# Patient Record
Sex: MALE | Race: WHITE | NOT HISPANIC OR LATINO | Employment: FULL TIME | URBAN - METROPOLITAN AREA
[De-identification: names, ages, dates, MRNs, and addresses within clinical notes are randomized per-mention and may not be internally consistent; named-entity substitution may affect disease eponyms.]

---

## 2020-07-30 ENCOUNTER — OFFICE VISIT (OUTPATIENT)
Dept: INTERNAL MEDICINE CLINIC | Facility: CLINIC | Age: 35
End: 2020-07-30

## 2020-07-30 ENCOUNTER — APPOINTMENT (OUTPATIENT)
Dept: LAB | Facility: HOSPITAL | Age: 35
End: 2020-07-30
Payer: COMMERCIAL

## 2020-07-30 VITALS
TEMPERATURE: 96.6 F | HEART RATE: 60 BPM | DIASTOLIC BLOOD PRESSURE: 72 MMHG | SYSTOLIC BLOOD PRESSURE: 104 MMHG | WEIGHT: 157.41 LBS | BODY MASS INDEX: 24.71 KG/M2 | HEIGHT: 67 IN

## 2020-07-30 DIAGNOSIS — Z00.00 ENCOUNTER FOR MEDICAL EXAMINATION TO ESTABLISH CARE: Primary | ICD-10-CM

## 2020-07-30 DIAGNOSIS — Z00.00 ENCOUNTER FOR MEDICAL EXAMINATION TO ESTABLISH CARE: ICD-10-CM

## 2020-07-30 LAB
ALBUMIN SERPL BCP-MCNC: 4 G/DL (ref 3.5–5)
ALP SERPL-CCNC: 67 U/L (ref 46–116)
ALT SERPL W P-5'-P-CCNC: 42 U/L (ref 12–78)
ANION GAP SERPL CALCULATED.3IONS-SCNC: 4 MMOL/L (ref 4–13)
AST SERPL W P-5'-P-CCNC: 20 U/L (ref 5–45)
BASOPHILS # BLD AUTO: 0.06 THOUSANDS/ΜL (ref 0–0.1)
BASOPHILS NFR BLD AUTO: 1 % (ref 0–1)
BILIRUB SERPL-MCNC: 1.04 MG/DL (ref 0.2–1)
BUN SERPL-MCNC: 15 MG/DL (ref 5–25)
CALCIUM ALBUM COR SERPL-MCNC: 10.3 MG/DL (ref 8.3–10.1)
CALCIUM SERPL-MCNC: 10.3 MG/DL (ref 8.3–10.1)
CHLORIDE SERPL-SCNC: 107 MMOL/L (ref 100–108)
CHOLEST SERPL-MCNC: 183 MG/DL (ref 50–200)
CO2 SERPL-SCNC: 29 MMOL/L (ref 21–32)
CREAT SERPL-MCNC: 0.79 MG/DL (ref 0.6–1.3)
EOSINOPHIL # BLD AUTO: 0.18 THOUSAND/ΜL (ref 0–0.61)
EOSINOPHIL NFR BLD AUTO: 2 % (ref 0–6)
ERYTHROCYTE [DISTWIDTH] IN BLOOD BY AUTOMATED COUNT: 12.7 % (ref 11.6–15.1)
GFR SERPL CREATININE-BSD FRML MDRD: 116 ML/MIN/1.73SQ M
GLUCOSE P FAST SERPL-MCNC: 90 MG/DL (ref 65–99)
HCT VFR BLD AUTO: 42.6 % (ref 36.5–49.3)
HCV AB SER QL: NORMAL
HDLC SERPL-MCNC: 51 MG/DL
HGB BLD-MCNC: 13.9 G/DL (ref 12–17)
IMM GRANULOCYTES # BLD AUTO: 0.02 THOUSAND/UL (ref 0–0.2)
IMM GRANULOCYTES NFR BLD AUTO: 0 % (ref 0–2)
LDLC SERPL CALC-MCNC: 117 MG/DL (ref 0–100)
LYMPHOCYTES # BLD AUTO: 2.64 THOUSANDS/ΜL (ref 0.6–4.47)
LYMPHOCYTES NFR BLD AUTO: 32 % (ref 14–44)
MCH RBC QN AUTO: 29.6 PG (ref 26.8–34.3)
MCHC RBC AUTO-ENTMCNC: 32.6 G/DL (ref 31.4–37.4)
MCV RBC AUTO: 91 FL (ref 82–98)
MONOCYTES # BLD AUTO: 0.66 THOUSAND/ΜL (ref 0.17–1.22)
MONOCYTES NFR BLD AUTO: 8 % (ref 4–12)
NEUTROPHILS # BLD AUTO: 4.79 THOUSANDS/ΜL (ref 1.85–7.62)
NEUTS SEG NFR BLD AUTO: 57 % (ref 43–75)
NONHDLC SERPL-MCNC: 132 MG/DL
NRBC BLD AUTO-RTO: 0 /100 WBCS
PLATELET # BLD AUTO: 322 THOUSANDS/UL (ref 149–390)
PMV BLD AUTO: 9.7 FL (ref 8.9–12.7)
POTASSIUM SERPL-SCNC: 3.9 MMOL/L (ref 3.5–5.3)
PROT SERPL-MCNC: 7.4 G/DL (ref 6.4–8.2)
RBC # BLD AUTO: 4.7 MILLION/UL (ref 3.88–5.62)
SODIUM SERPL-SCNC: 140 MMOL/L (ref 136–145)
TRIGL SERPL-MCNC: 75 MG/DL
WBC # BLD AUTO: 8.35 THOUSAND/UL (ref 4.31–10.16)

## 2020-07-30 PROCEDURE — 80061 LIPID PANEL: CPT

## 2020-07-30 PROCEDURE — 3008F BODY MASS INDEX DOCD: CPT | Performed by: INTERNAL MEDICINE

## 2020-07-30 PROCEDURE — 80053 COMPREHEN METABOLIC PANEL: CPT

## 2020-07-30 PROCEDURE — 87389 HIV-1 AG W/HIV-1&-2 AB AG IA: CPT

## 2020-07-30 PROCEDURE — 86803 HEPATITIS C AB TEST: CPT

## 2020-07-30 PROCEDURE — 36415 COLL VENOUS BLD VENIPUNCTURE: CPT

## 2020-07-30 PROCEDURE — 85025 COMPLETE CBC W/AUTO DIFF WBC: CPT

## 2020-07-30 PROCEDURE — 99385 PREV VISIT NEW AGE 18-39: CPT | Performed by: INTERNAL MEDICINE

## 2020-07-30 NOTE — PROGRESS NOTES
INTERNAL MEDICINE OFFICE VISIT  [facility]  [address] [place] [address]    NAME: Alejandro Minor  AGE: 27 y o  SEX: male    DATE OF ENCOUNTER: 7/30/2020    Assessment and Plan     Diagnoses and all orders for this visit:    Encounter for medical examination to establish care  -     Comprehensive metabolic panel; Future  -     CBC and differential; Future  -     Lipid panel; Future  -     tetanus-diphtheria-acellular pertussis (ADACEL) 5-2-15 5 LF-mcg/0 5 injection; Inject 0 5 mL into a muscle once for 1 dose  -     HIV 1/2 Antigen/Antibody (4th Generation) w Reflex [facility]; Future  -     Hepatitis C antibody; Future        Orders Placed This Encounter   Procedures    Comprehensive metabolic panel    CBC and differential    Lipid panel    HIV 1/2 Antigen/Antibody (4th Generation) w Reflex [facility]    Hepatitis C antibody     Follow up in 1 year, unless need to be seen earlier, or abnormal results  Chief Complaint     Chief Complaint   Patient presents with    Establish Care       History of Present Illness     HPI  Mr Lou Walker is a 26 yo male, who presents to establish care and have physical exam done, he denies any current symptoms, illness or health concern  Patient agrees with the plan for having necessary screening tests and lab works (see assessment and plan)  PMH: negative   PSH: Appendectomy, and Lap Cholecystectomy (in 2015) d/t Acute cholecystitis  Family Hx: Mother has DM-II diagnosed 2 years ago, takes oral DM meds only   Social Hx: lives w wife and 3 kids, 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), notes warm weather at work, but keeps hydrated by drinking water and "Gatorade"     Sexual: 1 sexual partner (wife), + oral sex    Smoking: hx of smoking 4-10 cigarettes/day for 10-15 years, quit smoking 5 years ago   Alcohol: hx of drinking about 2 drinks/week for about 5 years, stopped drinking 5 years ago   Drug Abuse: denies any   Allergy: NKDA, no food allergy   Medication: no daily meds   Vaccination: childhood vaccine, and notes receiving Tetanus vaccine 4 years ago after a minor cut in hand while using knife in a kitchen       The following portions of the patient's history were reviewed and updated as appropriate: allergies, current medications, past family history, past medical history, past social history, past surgical history and problem list     Review of Systems   - GENERAL: Negative for any nausea, vomiting, fevers, chills, or weight loss  - HEENT: Negative for any head/Neck trauma, pain, double/blurry vision, sinusitis, rhinitis, nose bleeding   - CARDIAC: Negative for any chest pain, palpitation, Dyspnea on exertion, peripheral edema  - PULMONARY: Negative for any SOB, cough, wheezing    - GASTROINTESTINAL: Negative for any abdominal pain, N/V/D/C, blood in stool    - GENITOURINARY: Negative for any dysuria, hematuria, incontinence   - NEUROLOGIC: Negative for any muscle weakness, numbness/tingling, memory changes  - MUSCULOSKELETAL: Positive for hx L knee pain a month ago, used OTC "cream" and pain resolved  Currently negative for any joint pains/swelling, limited ROM  - INTEGUMENTARY: Negative for any rashes, cuts/ lesions   - HEMATOLOGIC: Negative for any abnormal bruising, frequent infections or bleeding  Active Problem List   There is no problem list on file for this patient        Objective     /72 (BP Location: Left arm, Patient Position: Sitting, Cuff Size: Adult)   Pulse 60   Temp (!) 96 6 °F (35 9 °C) (Temporal)   Ht 5' 7" (1 702 m)   Wt 71 4 kg (157 lb 6 5 oz)   BMI 24 65 kg/m²     Physical Exam  - GEN: Appears well, alert and oriented x 3, pleasant and cooperative, in no acute distress  - HEENT: Anicteric, mucous membranes moist, PERRL and EOMI   - NECK: No lymphadenopathy, JVD or carotid bruits   - HEART: RRR, normal S1 and S2, no murmurs, clicks, gallops or rubs   - LUNGS: Clear to auscultation bilaterally; no wheezes, rales, or rhonchi  - ABDOMEN: Normal bowel sounds, soft, no tenderness, no distention, no organomegaly or masses felt on exam    - EXTREMITIES: Peripheral pulses normal; no clubbing, cyanosis, or edema  - NEURO: No focal findings, CN II-XII are grossly intact  - Musculoskeletal: 5/5 strength, normal ROM, no swollen or erythematous joints  - SKIN: Normal without suspicious lesions on exposed skin    Current Medications   None     Health Maintenance     Health Maintenance   Topic Date Due    DTaP,Tdap,and Td Vaccines (1 - Tdap) 12/11/1995    Depression Screening PHQ  12/11/1996    HIV Screening  12/11/1999    Annual Physical  12/11/2002    Influenza Vaccine  07/01/2020    BMI: Adult  07/30/2021    Pneumococcal Vaccine: 65+ Years (1 of 2 - PCV13) 12/11/2049    Pneumococcal Vaccine: Pediatrics (0 to 5 Years) and At-Risk Patients (6 to 59 Years)  Aged Out    HIB Vaccine  Aged Out    Hepatitis B Vaccine  Aged Out    IPV Vaccine  Aged Out    Hepatitis A Vaccine  Aged Out    Meningococcal ACWY Vaccine  Aged Out    HPV Vaccine  Aged Out       There is no immunization history on file for this patient  Patient notes having childhood immunization plus Tetanus vaccine 4 years ago  Time spent with patient during this visit is 40 minutes, patient education, complete history and physical exam  My attending, Dr Matilde Batista was present in the exam room, saw the patient, and agreed on the assessment and plan noted above       Deisy Arias DO  Internal Medicine  PGY-1  7/30/2020 9:20 AM

## 2020-07-30 NOTE — PATIENT INSTRUCTIONS
Diphtheria/Acellular Pertussis/Tetanus Booster Vaccine (Tdap) (By injection)   Pertussis Vaccine, Acellular (per-TUS-iss VAX-een, i-FVCX-upo-lar), Reduced Diphtheria Toxoid (ree-DOOST dif-THEER-ee-a TOX-oyd), Tetanus Toxoid (TET-a-nus TOX-oyd)  Protects against infections caused by tetanus (lockjaw), diphtheria, or pertussis (whooping cough)  This is a booster vaccine  Brand Name(s): Adacel, Boostrix   There may be other brand names for this medicine  When This Medicine Should Not Be Used: You should not receive this vaccine if you have had an allergic reaction to the separate or combined tetanus, diphtheria, or pertussis vaccine  You should not receive this vaccine if you have had seizures, mental changes, or any other serious reaction within 7 days after you received a pertussis vaccine  How to Use This Medicine:   Injectable  · A nurse or other health provider will give you this medicine  · Your doctor will prescribe your exact dose and tell you how often it should be given  This medicine is given as a shot into one of your muscles  · You may receive other vaccines at the same time as this one, but in a different body area  You should receive patient instructions for all of the vaccines  Talk to your doctor or nurse if you have questions  · Read and follow the patient instructions that come with this medicine  Talk to your doctor or pharmacist if you have any questions  Drugs and Foods to Avoid:   Ask your doctor or pharmacist before using any other medicine, including over-the-counter medicines, vitamins, and herbal products  · Make sure the doctor knows if you are receiving a treatment or medicine that weakens your immune system  This includes radiation treatment, steroid medicines (such as dexamethasone, hydrocortisone, methylprednisolone, prednisolone, prednisone, Medrol®), or cancer medicines    Warnings While Using This Medicine:   · Make sure your doctor knows if you are pregnant or breastfeeding or have epilepsy, a weak immune system, or a history of a stroke  Tell your doctor if you are sick or have a fever  · Tell your doctor about any reaction you had after you received a vaccine  This includes fainting, seizures, a fever over 105 degrees F, or severe redness or swelling where the shot was given  Tell your doctor if you have a history of Guillain-Barré syndrome after you received a vaccine with tetanus  · Call your doctor right away if you faint or have vision changes, numbness or tingling in your arms, hands, or feet, or a seizure after you receive this vaccine  · Tell your doctor if you have an allergy to latex  The syringes may contain dry natural latex rubber  · This vaccine will not treat an active infection  If you have a diphtheria, tetanus, or pertussis infection, you will need medicine to treat the infection  Possible Side Effects While Using This Medicine:   Call your doctor right away if you notice any of these side effects:  · Allergic reaction: Itching or hives, swelling in your face or hands, swelling or tingling in your mouth or throat, chest tightness, trouble breathing  · Changes in vision  · Fever over 105 degrees F  · Lightheadedness or fainting  · Numbness, tingling, or burning pain in your hands, arms, legs, or feet  · Seizures  · Sudden numbness or weakness in your arms or legs  · Severe pain, redness, or swelling where the shot was given  If you notice these less serious side effects, talk with your doctor:   · Headache  · Mild pain, redness, or swelling where the shot was given  · Nausea, vomiting, diarrhea, or stomach pain  · Tiredness  If you notice other side effects that you think are caused by this medicine, tell your doctor  Call your doctor for medical advice about side effects   You may report side effects to FDA at 4-198-FDA-8177  © 2017 [address] [employer] Information is for End User's use only and may not be sold, redistributed or otherwise used for commercial purposes  The above information is an  only  It is not intended as medical advice for individual conditions or treatments  Talk to your doctor, nurse or pharmacist before following any medical regimen to see if it is safe and effective for you

## 2020-07-31 LAB — HIV 1+2 AB+HIV1 P24 AG SERPL QL IA: NORMAL

## 2020-08-03 ENCOUNTER — TELEPHONE (OUTPATIENT)
Dept: INTERNAL MEDICINE CLINIC | Facility: CLINIC | Age: 35
End: 2020-08-03

## 2021-05-21 ENCOUNTER — OFFICE VISIT (OUTPATIENT)
Dept: INTERNAL MEDICINE CLINIC | Facility: CLINIC | Age: 36
End: 2021-05-21

## 2021-05-21 ENCOUNTER — TRANSCRIBE ORDERS (OUTPATIENT)
Dept: ADMINISTRATIVE | Facility: HOSPITAL | Age: 36
End: 2021-05-21

## 2021-05-21 ENCOUNTER — HOSPITAL ENCOUNTER (OUTPATIENT)
Dept: RADIOLOGY | Facility: HOSPITAL | Age: 36
Discharge: HOME | End: 2021-05-21
Payer: COMMERCIAL

## 2021-05-21 VITALS
SYSTOLIC BLOOD PRESSURE: 130 MMHG | BODY MASS INDEX: 25.71 KG/M2 | HEART RATE: 60 BPM | TEMPERATURE: 97.8 F | DIASTOLIC BLOOD PRESSURE: 84 MMHG | HEIGHT: 66 IN | OXYGEN SATURATION: 99 % | WEIGHT: 160 LBS

## 2021-05-21 DIAGNOSIS — G89.29 CHRONIC PAIN IN LEFT SHOULDER: ICD-10-CM

## 2021-05-21 DIAGNOSIS — M25.512 CHRONIC PAIN IN LEFT SHOULDER: ICD-10-CM

## 2021-05-21 DIAGNOSIS — M25.512 CHRONIC PAIN IN LEFT SHOULDER: Primary | ICD-10-CM

## 2021-05-21 DIAGNOSIS — G89.29 CHRONIC PAIN IN LEFT SHOULDER: Primary | ICD-10-CM

## 2021-05-21 PROCEDURE — 73030 X-RAY EXAM OF SHOULDER: CPT

## 2021-05-21 PROCEDURE — 99213 OFFICE O/P EST LOW 20 MIN: CPT | Performed by: PHYSICIAN ASSISTANT

## 2021-05-21 PROCEDURE — 1036F TOBACCO NON-USER: CPT | Performed by: PHYSICIAN ASSISTANT

## 2021-05-21 RX ORDER — NAPROXEN 500 MG/1
500 TABLET ORAL 2 TIMES DAILY WITH MEALS
Qty: 20 TABLET | Refills: 0 | Status: SHIPPED | OUTPATIENT
Start: 2021-05-21 | End: 2021-07-08 | Stop reason: SDUPTHER

## 2021-05-21 NOTE — PROGRESS NOTES
Assessment/Plan: On your visit today we reviewed that you are having exacerbation of previous shoulder pain that started again 1 month ago and has been getting progressively worse  It is to the point where you have pain at rest although pain at rest is alleviated with over-the-counter medication as soon as you try to use your left shoulder to push pull or lift the pain significantly returns  Without medication you have pain with activity and at rest       Please get the x-ray completed  Based on this results will determine if you need physical therapy as well  Referral provided today to the orthopedist you may call and schedule that appointment but based on the results may also need to start physical therapy as well  Prescription for naproxen 500 mg 1 tablet twice daily with food sent to your pharmacy  As per our discussion based on results will determine if your PCP needs to provide any limitations at work  No problem-specific Assessment & Plan notes found for this encounter  Diagnoses and all orders for this visit:    Chronic pain in left shoulder  -     XR shoulder 2+ vw left; Future  -     naproxen (NAPROSYN) 500 mg tablet; Take 1 tablet (500 mg total) by mouth 2 (two) times a day with meals  -     Ambulatory referral to Orthopedic Surgery; Future          Subjective:      Patient ID: Nohelia Becerra is a 38 y o  male  Patient presents today for same-day medical home with complaint of left shoulder pain that started 1 month ago  Patient reports he has had similar in the past   Patient reports at that time he was referred to see an orthopedist but he never went  States in the past similar but was more in the back and not as bad    States pain has been getting worse, no acute trauma or injury  Denies any previous injury    Hurts to push pull lift anything      States takes advil and that will alleviate all pain at rest but if tries to lift or push anything pain will be there, without advil will have pain at rest as well  States sometimes will fell numbness down into L arm and sometimes into neck  Mostly just top of L shoulder  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    Is R hand dominant        The following portions of the patient's history were reviewed and updated as appropriate: allergies, current medications, past family history, past medical history, past social history, past surgical history and problem list     Review of Systems   Constitutional: Negative  Negative for chills and fever  Respiratory: Negative  Cardiovascular: Negative  Musculoskeletal: Positive for arthralgias  Negative for joint swelling  Neurological: Positive for numbness  Negative for weakness  Objective:      /84 (BP Location: Left arm, Patient Position: Sitting, Cuff Size: Standard)   Pulse 60   Temp 97 8 °F (36 6 °C) (Temporal)   Ht 5' 5 5" (1 664 m)   Wt 72 6 kg (160 lb)   SpO2 99%   BMI 26 22 kg/m²          Physical Exam  Vitals signs and nursing note reviewed  Constitutional:       General: He is not in acute distress  Appearance: He is not ill-appearing  HENT:      Head: Normocephalic  Cardiovascular:      Rate and Rhythm: Normal rate and regular rhythm  Heart sounds: Normal heart sounds  Pulmonary:      Effort: Pulmonary effort is normal       Breath sounds: Normal breath sounds  Abdominal:      General: Bowel sounds are normal    Musculoskeletal:         General: Tenderness present  No swelling, deformity or signs of injury  Left shoulder: He exhibits tenderness and bony tenderness  He exhibits normal range of motion  Comments: Patient does have point tenderness at [facility] joint and just superiorly  Patient does have full range of motion however does have pain with full extension and internal rotation mild discomfort with external rotation  Mild discomfort with empty can testing      Patient does have pain with resistance to flexion no significant pain with resistance to extension   pain but not full limitation with apprehension testing   Skin:     Findings: No rash  Neurological:      General: No focal deficit present  Mental Status: He is alert  Sensory: No sensory deficit  Motor: No weakness        Deep Tendon Reflexes: Reflexes normal       Comments:  5/5 equal jose rafael UE   Psychiatric:         Mood and Affect: Mood normal

## 2021-05-21 NOTE — PATIENT INSTRUCTIONS
On your visit today we reviewed that you are having exacerbation of previous shoulder pain that started again 1 month ago and has been getting progressively worse  It is to the point where you have pain at rest although pain at rest is alleviated with over-the-counter medication as soon as you try to use your left shoulder to push pull or lift the pain significantly returns  Without medication you have pain with activity and at rest       Please get the x-ray completed  Based on this results will determine if you need physical therapy as well  Referral provided today to the orthopedist you may call and schedule that appointment but based on the results may also need to start physical therapy as well  Prescription for naproxen 500 mg 1 tablet twice daily with food sent to your pharmacy  As per our discussion based on results will determine if your PCP needs to provide any limitations at work

## 2021-06-09 VITALS
DIASTOLIC BLOOD PRESSURE: 80 MMHG | HEART RATE: 59 BPM | BODY MASS INDEX: 26.56 KG/M2 | SYSTOLIC BLOOD PRESSURE: 135 MMHG | WEIGHT: 159.4 LBS | HEIGHT: 65 IN

## 2021-06-09 DIAGNOSIS — M25.512 CHRONIC PAIN IN LEFT SHOULDER: ICD-10-CM

## 2021-06-09 DIAGNOSIS — G89.29 CHRONIC PAIN IN LEFT SHOULDER: ICD-10-CM

## 2021-06-09 DIAGNOSIS — M75.82 TENDINITIS OF LEFT ROTATOR CUFF: Primary | ICD-10-CM

## 2021-06-09 DIAGNOSIS — M54.12 RADICULOPATHY, CERVICAL REGION: ICD-10-CM

## 2021-06-09 PROCEDURE — 3008F BODY MASS INDEX DOCD: CPT | Performed by: ORTHOPAEDIC SURGERY

## 2021-06-09 PROCEDURE — 3008F BODY MASS INDEX DOCD: CPT | Performed by: PHYSICIAN ASSISTANT

## 2021-06-09 PROCEDURE — 1036F TOBACCO NON-USER: CPT | Performed by: ORTHOPAEDIC SURGERY

## 2021-06-09 PROCEDURE — 99243 OFF/OP CNSLTJ NEW/EST LOW 30: CPT | Performed by: ORTHOPAEDIC SURGERY

## 2021-06-09 RX ORDER — METHYLPREDNISOLONE 4 MG/1
TABLET ORAL
Qty: 21 TABLET | Refills: 0 | Status: SHIPPED | OUTPATIENT
Start: 2021-06-09 | End: 2021-11-29 | Stop reason: SDUPTHER

## 2021-06-09 NOTE — PROGRESS NOTES
Orthopaedics Office Visit - 1st Patient Visit    ASSESSMENT/PLAN:    Assessment:   Left shoulder pain   Cervical radiculopathy  Rotator cuff tendonitis       Plan:   - Discussed conservative treatment with patient at length  - Begin physical therapy as directed   - Begin Medrol Dosepak as directed  - Pain management referral placed for evaluation of cervical radiculopathy   - Over the counter analgesics as needed / directed   - Ice / heat as directed   - discussed possibility of cortisone injection in future pending symptoms  - Follow up 6 weeks       To Do Next Visit:  Re-[place] left shoulder pain     _____________________________________________________  CHIEF COMPLAINT:  Chief Complaint   Patient presents with    Left Shoulder - Pain         SUBJECTIVE:  Fawn Boast is a 38 y o  male who presents to the office for initial evaluation of his left shoulder  Patient states that his symptoms have been ongoing for the past few months in duration with no injury of note  Patient describes his pain as the superior aspect of the shoulder which does radiate up into his neck into his face  Patient states that the pain becomes worse range of motion of the shoulder and with his neck  Patient states that he has been taking naproxen, Tylenol, icing/heating the shoulder with minimal relief of symptoms  Patient states that the with pain medication the pain subsides while at rest but states that when he is moving his shoulder the pain flares back up  Patient does admit to having radicular symptoms into the left arm  Patient does admit to having weakness in the arm  Patient denies any injuries to the cervical spine  Patient offers no other complaints at this time  PAST MEDICAL HISTORY:  History reviewed  No pertinent past medical history      PAST SURGICAL HISTORY:  Past Surgical History:   Procedure Laterality Date    APPENDECTOMY      LAPAROSCOPIC CHOLECYSTECTOMY  05/20/2015       FAMILY HISTORY:  Family History   Problem Relation Age of Onset    Diabetes Mother     No Known Problems Father     No Known Problems Brother        SOCIAL HISTORY:  Social History     Tobacco Use    Smoking status: Former Smoker     Packs/day: 0 50     Years: 10 00     Pack years: 5 00    Smokeless tobacco: Never Used    Tobacco comment: quit 5 yrs ago-as per [place]   Substance Use Topics    Alcohol use: Not Currently     Frequency: Never     Drinks per session: 1 or 2     Binge frequency: Never     Comment: used to drink 1-2 drinks/aweek > 5years ago ffor 5-10 years    Drug use: Never       MEDICATIONS:    Current Outpatient Medications:     naproxen (NAPROSYN) 500 mg tablet, Take 1 tablet (500 mg total) by mouth 2 (two) times a day with meals, Disp: 20 tablet, Rfl: 0    ALLERGIES:  No Known Allergies    REVIEW OF SYSTEMS:  MSK: left shoulder pain  Neuro: negative  Pertinent items are otherwise noted in HPI  A comprehensive review of systems was otherwise negative  LABS:  HgA1c: No results found for: HGBA1C  BMP:   Lab Results   Component Value Date    GLUCOSE 92 05/20/2015    CALCIUM 10 3 (H) 07/30/2020     05/20/2015    K 3 9 07/30/2020    CO2 29 07/30/2020     07/30/2020    BUN 15 07/30/2020    CREATININE 0 79 07/30/2020     CBC: No components found for: CBC    _____________________________________________________  PHYSICAL EXAMINATION:  Vital signs: /80   Pulse 59   Ht 5' 5" (1 651 m)   Wt 72 3 kg (159 lb 6 4 oz)   BMI 26 53 kg/m²   General: No acute distress, awake and alert  Psychiatric: Mood and affect appear appropriate  HEENT: Trachea Midline, No torticollis, no apparent facial trauma  Cardiovascular: No audible murmurs;  Extremities appear perfused  Pulmonary: No audible wheezing or stridor  Skin: No open lesions; see further details (if any) below      MUSCULOSKELETAL EXAMINATION:  Left shoulder examination:  - Patient sitting comfortably in the office in no acute distress   -   No acute visible abnormalities present in the left shoulder  Extremity appears well-perfused overall   -   Mild tenderness palpation noted over the greater tuberosity  Majority of tenderness associated over the trapezial muscle group  No other bony or soft tissue tenderness palpation noted at this time  -   150° of range of motion noted with forward flexion abduction of the shoulder   - 4/5 strength noted with left abduction, wrist extension, intrinsic muscles  - Special Tests       - + spurlings to left side, negative lehermits        - positive neer   - NV intact      _____________________________________________________  STUDIES REVIEWED:  I personally reviewed the images and interpretation is as follows:  XR shoulder 2+ vw left  Mild osteoarthritis acromioclavicular joint  No acute osseous abnormalities present  PROCEDURES PERFORMED:  No procedures were performed at this time           Yasmin Wheeler PA-C - assisting    Shireen Aviles MD

## 2021-06-09 NOTE — LETTER
June 14, 2021     Ayde Arroyo PA-C  [address] [facility]    Patient: Lolita Hernandez   YOB: 1985   Date of Visit: 6/9/2021       Dear Dr Roberto Natarajan: Thank you for referring Lolita Hernandez to me for evaluation  Below are my notes for this consultation  If you have questions, please do not hesitate to call me  I look forward to following your patient along with you  Sincerely,        Isreal Pelayo MD        CC: No Recipients  Isreal Pelayo MD  6/13/2021 11:42 PM  Signed  Orthopaedics Office Visit - 1st Patient Visit    ASSESSMENT/PLAN:    Assessment:   Left shoulder pain   Cervical radiculopathy  Rotator cuff tendonitis       Plan:   - Discussed conservative treatment with patient at length  - Begin physical therapy as directed   - Begin Medrol Dosepak as directed  - Pain management referral placed for evaluation of cervical radiculopathy   - Over the counter analgesics as needed / directed   - Ice / heat as directed   - discussed possibility of cortisone injection in future pending symptoms  - Follow up 6 weeks       To Do Next Visit:  Re-evalaute left shoulder pain     _____________________________________________________  CHIEF COMPLAINT:  Chief Complaint   Patient presents with    Left Shoulder - Pain         SUBJECTIVE:  Lolita Hernandez is a 38 y o  male who presents to the office for initial evaluation of his left shoulder  Patient states that his symptoms have been ongoing for the past few months in duration with no injury of note  Patient describes his pain as the superior aspect of the shoulder which does radiate up into his neck into his face  Patient states that the pain becomes worse range of motion of the shoulder and with his neck  Patient states that he has been taking naproxen, Tylenol, icing/heating the shoulder with minimal relief of symptoms    Patient states that the with pain medication the pain subsides while at rest but states that when he is moving his shoulder the pain flares back up  Patient does admit to having radicular symptoms into the left arm  Patient does admit to having weakness in the arm  Patient denies any injuries to the cervical spine  Patient offers no other complaints at this time  PAST MEDICAL HISTORY:  History reviewed  No pertinent past medical history  PAST SURGICAL HISTORY:  Past Surgical History:   Procedure Laterality Date    APPENDECTOMY      LAPAROSCOPIC CHOLECYSTECTOMY  05/20/2015       FAMILY HISTORY:  Family History   Problem Relation Age of Onset    Diabetes Mother     No Known Problems Father     No Known Problems Brother        SOCIAL HISTORY:  Social History     Tobacco Use    Smoking status: Former Smoker     Packs/day: 0 50     Years: 10 00     Pack years: 5 00    Smokeless tobacco: Never Used    Tobacco comment: quit 5 yrs ago-as per [employer]   Substance Use Topics    Alcohol use: Not Currently     Frequency: Never     Drinks per session: 1 or 2     Binge frequency: Never     Comment: used to drink 1-2 drinks/aweek > 5years ago ffor 5-10 years    Drug use: Never       MEDICATIONS:    Current Outpatient Medications:     naproxen (NAPROSYN) 500 mg tablet, Take 1 tablet (500 mg total) by mouth 2 (two) times a day with meals, Disp: 20 tablet, Rfl: 0    ALLERGIES:  No Known Allergies    REVIEW OF SYSTEMS:  MSK: left shoulder pain  Neuro: negative  Pertinent items are otherwise noted in HPI  A comprehensive review of systems was otherwise negative      LABS:  HgA1c: No results found for: HGBA1C  BMP:   Lab Results   Component Value Date    GLUCOSE 92 05/20/2015    CALCIUM 10 3 (H) 07/30/2020     05/20/2015    K 3 9 07/30/2020    CO2 29 07/30/2020     07/30/2020    BUN 15 07/30/2020    CREATININE 0 79 07/30/2020     CBC: No components found for: CBC    _____________________________________________________  PHYSICAL EXAMINATION:  Vital signs: /80   Pulse 59   Ht 5' 5" (1 651 m)   Wt 72 3 kg (159 lb 6 4 oz)   BMI 26 53 kg/m²   General: No acute distress, awake and alert  Psychiatric: Mood and affect appear appropriate  HEENT: Trachea Midline, No torticollis, no apparent facial trauma  Cardiovascular: No audible murmurs; Extremities appear perfused  Pulmonary: No audible wheezing or stridor  Skin: No open lesions; see further details (if any) below      MUSCULOSKELETAL EXAMINATION:  Left shoulder examination:  - Patient sitting comfortably in the office in no acute distress   -   No acute visible abnormalities present in the left shoulder  Extremity appears well-perfused overall   -   Mild tenderness palpation noted over the greater tuberosity  Majority of tenderness associated over the trapezial muscle group  No other bony or soft tissue tenderness palpation noted at this time  -   150° of range of motion noted with forward flexion abduction of the shoulder   - 4/5 strength noted with left abduction, wrist extension, intrinsic muscles  - Special Tests       - + spurlings to left side, negative lehermits        - positive neer   - NV intact      _____________________________________________________  STUDIES REVIEWED:  I personally reviewed the images and interpretation is as follows:  XR shoulder 2+ vw left  Mild osteoarthritis acromioclavicular joint  No acute osseous abnormalities present  PROCEDURES PERFORMED:  No procedures were performed at this time           Ivelisse Stewart PA-C - assisting    Koko Gomez MD

## 2021-06-13 PROBLEM — M75.82 TENDINITIS OF LEFT ROTATOR CUFF: Status: ACTIVE | Noted: 2021-06-13

## 2021-07-08 DIAGNOSIS — M25.512 CHRONIC PAIN IN LEFT SHOULDER: ICD-10-CM

## 2021-07-08 DIAGNOSIS — G89.29 CHRONIC PAIN IN LEFT SHOULDER: ICD-10-CM

## 2021-07-08 RX ORDER — NAPROXEN 500 MG/1
TABLET ORAL
Qty: 20 TABLET | Refills: 0 | Status: SHIPPED | OUTPATIENT
Start: 2021-07-08 | End: 2021-11-29

## 2021-07-08 NOTE — TELEPHONE ENCOUNTER
Name of medication, dose, quantity and frequency  Requested Prescriptions     Pending Prescriptions Disp Refills    naproxen (NAPROSYN) 500 mg tablet 20 tablet 0     Sig: Take 1 tablet (500 mg total) by mouth 2 (two) times a day with meals         Number of refills left:    Amount of medication left:    Pharmacy verified and updated    Additional information:  Patient called stating this medication helped with pain  I provided ph# to PT & Pain Management

## 2021-11-29 ENCOUNTER — TELEMEDICINE (OUTPATIENT)
Dept: INTERNAL MEDICINE CLINIC | Facility: CLINIC | Age: 36
End: 2021-11-29

## 2021-11-29 DIAGNOSIS — J01.90 ACUTE SINUSITIS, RECURRENCE NOT SPECIFIED, UNSPECIFIED LOCATION: Primary | ICD-10-CM

## 2021-11-29 PROCEDURE — 99213 OFFICE O/P EST LOW 20 MIN: CPT | Performed by: PHYSICIAN ASSISTANT

## 2021-11-29 RX ORDER — METHYLPREDNISOLONE 4 MG/1
TABLET ORAL
Qty: 21 TABLET | Refills: 0 | Status: SHIPPED | OUTPATIENT
Start: 2021-11-29

## 2021-11-29 RX ORDER — BENZONATATE 200 MG/1
200 CAPSULE ORAL 3 TIMES DAILY PRN
Qty: 20 CAPSULE | Refills: 0 | Status: SHIPPED | OUTPATIENT
Start: 2021-11-29

## 2021-11-29 RX ORDER — AMOXICILLIN AND CLAVULANATE POTASSIUM 875; 125 MG/1; MG/1
1 TABLET, FILM COATED ORAL EVERY 12 HOURS SCHEDULED
Qty: 14 TABLET | Refills: 0 | Status: SHIPPED | OUTPATIENT
Start: 2021-11-29 | End: 2021-12-06

## 2024-07-26 ENCOUNTER — HOSPITAL ENCOUNTER (EMERGENCY)
Facility: HOSPITAL | Age: 39
Discharge: HOME | End: 2024-07-27
Attending: EMERGENCY MEDICINE
Payer: COMMERCIAL

## 2024-07-26 DIAGNOSIS — R07.9 CHEST PAIN: Primary | ICD-10-CM

## 2024-07-26 DIAGNOSIS — F43.21 GRIEF REACTION: ICD-10-CM

## 2024-07-26 PROCEDURE — 93005 ELECTROCARDIOGRAM TRACING: CPT

## 2024-07-26 PROCEDURE — 99285 EMERGENCY DEPT VISIT HI MDM: CPT

## 2024-07-27 ENCOUNTER — APPOINTMENT (EMERGENCY)
Dept: RADIOLOGY | Facility: HOSPITAL | Age: 39
End: 2024-07-27
Payer: COMMERCIAL

## 2024-07-27 VITALS
DIASTOLIC BLOOD PRESSURE: 77 MMHG | TEMPERATURE: 97.7 F | RESPIRATION RATE: 16 BRPM | SYSTOLIC BLOOD PRESSURE: 111 MMHG | OXYGEN SATURATION: 99 % | HEART RATE: 59 BPM

## 2024-07-27 LAB
2HR DELTA HS TROPONIN: 3 NG/L
ALBUMIN SERPL BCG-MCNC: 4.4 G/DL (ref 3.5–5)
ALP SERPL-CCNC: 57 U/L (ref 34–104)
ALT SERPL W P-5'-P-CCNC: 29 U/L (ref 7–52)
ANION GAP SERPL CALCULATED.3IONS-SCNC: 10 MMOL/L (ref 4–13)
AST SERPL W P-5'-P-CCNC: 22 U/L (ref 13–39)
BASOPHILS # BLD AUTO: 0.1 THOUSANDS/ÂΜL (ref 0–0.1)
BASOPHILS NFR BLD AUTO: 1 % (ref 0–1)
BILIRUB SERPL-MCNC: 0.53 MG/DL (ref 0.2–1)
BUN SERPL-MCNC: 13 MG/DL (ref 5–25)
CALCIUM SERPL-MCNC: 9.4 MG/DL (ref 8.4–10.2)
CARDIAC TROPONIN I PNL SERPL HS: 10 NG/L
CARDIAC TROPONIN I PNL SERPL HS: 13 NG/L
CHLORIDE SERPL-SCNC: 104 MMOL/L (ref 96–108)
CO2 SERPL-SCNC: 24 MMOL/L (ref 21–32)
CREAT SERPL-MCNC: 0.81 MG/DL (ref 0.6–1.3)
EOSINOPHIL # BLD AUTO: 0.25 THOUSAND/ÂΜL (ref 0–0.61)
EOSINOPHIL NFR BLD AUTO: 2 % (ref 0–6)
ERYTHROCYTE [DISTWIDTH] IN BLOOD BY AUTOMATED COUNT: 12.2 % (ref 11.6–15.1)
GFR SERPL CREATININE-BSD FRML MDRD: 111 ML/MIN/1.73SQ M
GLUCOSE SERPL-MCNC: 94 MG/DL (ref 65–140)
HCT VFR BLD AUTO: 43.2 % (ref 36.5–49.3)
HGB BLD-MCNC: 14.7 G/DL (ref 12–17)
IMM GRANULOCYTES # BLD AUTO: 0.05 THOUSAND/UL (ref 0–0.2)
IMM GRANULOCYTES NFR BLD AUTO: 1 % (ref 0–2)
LYMPHOCYTES # BLD AUTO: 3.75 THOUSANDS/ÂΜL (ref 0.6–4.47)
LYMPHOCYTES NFR BLD AUTO: 35 % (ref 14–44)
MCH RBC QN AUTO: 29.8 PG (ref 26.8–34.3)
MCHC RBC AUTO-ENTMCNC: 34 G/DL (ref 31.4–37.4)
MCV RBC AUTO: 88 FL (ref 82–98)
MONOCYTES # BLD AUTO: 1.11 THOUSAND/ÂΜL (ref 0.17–1.22)
MONOCYTES NFR BLD AUTO: 10 % (ref 4–12)
NEUTROPHILS # BLD AUTO: 5.58 THOUSANDS/ÂΜL (ref 1.85–7.62)
NEUTS SEG NFR BLD AUTO: 51 % (ref 43–75)
NRBC BLD AUTO-RTO: 0 /100 WBCS
PLATELET # BLD AUTO: 372 THOUSANDS/UL (ref 149–390)
PMV BLD AUTO: 9.4 FL (ref 8.9–12.7)
POTASSIUM SERPL-SCNC: 3.6 MMOL/L (ref 3.5–5.3)
PROT SERPL-MCNC: 7.2 G/DL (ref 6.4–8.4)
RBC # BLD AUTO: 4.93 MILLION/UL (ref 3.88–5.62)
SODIUM SERPL-SCNC: 138 MMOL/L (ref 135–147)
WBC # BLD AUTO: 10.84 THOUSAND/UL (ref 4.31–10.16)

## 2024-07-27 PROCEDURE — 80053 COMPREHEN METABOLIC PANEL: CPT

## 2024-07-27 PROCEDURE — 96374 THER/PROPH/DIAG INJ IV PUSH: CPT

## 2024-07-27 PROCEDURE — 84484 ASSAY OF TROPONIN QUANT: CPT

## 2024-07-27 PROCEDURE — 99285 EMERGENCY DEPT VISIT HI MDM: CPT | Performed by: EMERGENCY MEDICINE

## 2024-07-27 PROCEDURE — 36415 COLL VENOUS BLD VENIPUNCTURE: CPT

## 2024-07-27 PROCEDURE — 85025 COMPLETE CBC W/AUTO DIFF WBC: CPT

## 2024-07-27 PROCEDURE — 71045 X-RAY EXAM CHEST 1 VIEW: CPT

## 2024-07-27 RX ORDER — LORAZEPAM 0.5 MG/1
0.5 TABLET ORAL EVERY 8 HOURS PRN
Qty: 21 TABLET | Refills: 0 | Status: SHIPPED | OUTPATIENT
Start: 2024-07-27

## 2024-07-27 RX ORDER — LORAZEPAM 2 MG/ML
1 INJECTION INTRAMUSCULAR ONCE
Status: COMPLETED | OUTPATIENT
Start: 2024-07-27 | End: 2024-07-27

## 2024-07-27 RX ADMIN — LORAZEPAM 1 MG: 2 INJECTION INTRAMUSCULAR; INTRAVENOUS at 00:42

## 2024-07-27 NOTE — DISCHARGE INSTRUCTIONS
You were seen in the ER for chest pain. Normal EKG chest xray labs. Follow up with your PCP. If symptoms worsen or persist return to the ER. Medication for grief/anxiety/insomnia sent to pharmacy can take as needed until you can follow up with PCP

## 2024-07-27 NOTE — ED CARE HANDOFF
Emergency Department Sign Out Note        Sign out and transfer of care from Dr. Najera. See Separate Emergency Department note.     The patient, Fransisco Muir, was evaluated by the previous provider for chest pain.    Workup Completed:  CXR, ECG    ED Course / Workup Pending (followup):  Labs      HEART Risk Score      Flowsheet Row Most Recent Value   Heart Score Risk Calculator    History 0 Filed at: 07/27/2024 0316   ECG 0 Filed at: 07/27/2024 0316   Age 0 Filed at: 07/27/2024 0316   Risk Factors 0 Filed at: 07/27/2024 0316   Troponin 1 Filed at: 07/27/2024 0316   HEART Score 1 Filed at: 07/27/2024 0316                                    ED Course as of 07/27/24 0322   Sat Jul 27, 2024   0114 SO: active, pending trop. No delta if normal   0124 hs TnI 0hr: 10  Will delta       Procedures  Medical Decision Making  Patient is a 38-year-old male presenting chest pain.    Differential includes but not limited to ACS, anxiety/grief, doubt pneumonia, doubt thorax.  Cardiac workup ordered.  ECG and chest x-ray without concerns.  CBC and CMP without concerns.  Initial troponin of 10, will check delta.  Delta of 3.  Patient treated symptomatically and felt significantly better.    Patient was cleared for discharge with PCP follow-up and return precautions.    Amount and/or Complexity of Data Reviewed  Labs: ordered. Decision-making details documented in ED Course.  Radiology: ordered and independent interpretation performed.    Risk  Prescription drug management.            Disposition  Final diagnoses:   Chest pain   Grief reaction     Time reflects when diagnosis was documented in both MDM as applicable and the Disposition within this note       Time User Action Codes Description Comment    7/27/2024 12:28 AM Arsenio Najera [R07.9] Chest pain     7/27/2024 12:35 AM Raulito Paredes [F43.21] Grief reaction     7/27/2024 12:53 AM Arsenio Najera [F43.21] Feeling grief     7/27/2024 12:53 AM  Arsenio Najera [F43.21] Feeling grief           ED Disposition       ED Disposition   Discharge    Condition   Stable    Date/Time   Sat Jul 27, 2024 8228    Comment   Fransisco Muir discharge to home/self care.                   Follow-up Information       Follow up With Specialties Details Why Contact Major Daniels DO Hematology and Oncology, Internal Medicine Call   [address] [facility] [id number] [phone]            Patient's Medications   Discharge Prescriptions    LORAZEPAM (ATIVAN) 0.5 MG TABLET    Take 1 tablet (0.5 mg total) by mouth every 8 (eight) hours as needed for anxiety for up to 21 doses       Start Date: 7/27/2024 End Date: --       Order Dose: 0.5 mg       Quantity: 21 tablet    Refills: 0     No discharge procedures on file.       ED Provider  Electronically Signed by     Sherman Borja MD  07/27/24 0322

## 2024-07-27 NOTE — ED ATTENDING ATTESTATION
2024  I, Raulito Paredes MD, saw and evaluated the patient. I have discussed the patient with the resident/non-physician practitioner and agree with the resident's/non-physician practitioner's findings, Plan of Care, and MDM as documented in the resident's/non-physician practitioner's note, except where noted. All available labs and Radiology studies were reviewed.  I was present for key portions of any procedure(s) performed by the resident/non-physician practitioner and I was immediately available to provide assistance.       At this point I agree with the current assessment done in the Emergency Department.  I have conducted an independent evaluation of this patient a history and physical is as follows:  Here with cp and insomnia chest pain has been consistent no shortness of breath  Mom  yesterday unexpectedly  Patient has been under a lot of stress  No shortness of breath no vomiting  No leg pain or leg swelling no history of DVT or PE  No cardiac risk factors non-smoker no hypertension no hyperlipidemia no stimulant use diabetes no known family history of early coronary disease  EXAM:   Const:   well appearing   NAD patient is tearful  HEENT:  NCAT    sclera anicteric conjunctiva pink   throat clear, MMM    Neck:   supple  no meningismus  no jvd   no bruits  no  midline tenderness   Lungs:   clear  CW non-tender   No creiptation  Heart:   RRR no m/g/r  Normal pulses  Abd:   soft nt nd pos bs   Ext:    normal nontender  No edema  Neruo:   CN 2 -12 intact  motor intact 5/5 sensory intact cerebellar intact       Gait normal    IMPRESSION:  PLAN:  EKG shows sinus bradycardia with a sinus arrhythmia T wave inversion in lead III  LVH no acute ischemic changes  Will check troponin  Will treat with Ativan  ED Course         Critical Care Time  Procedures

## 2024-07-27 NOTE — ED PROVIDER NOTES
History  Chief Complaint   Patient presents with    Chest Pain     Pt states having CP after taking three advil PM, due to not being able to sleep. Pt states his mother passed last night and is sure it is due to stress. Pt complains of L arm numbness and sob, that is on and off.      Patient is a 38-year-old male presenting for evaluation of chest pain.  Reports that he has had centralized chest pain symptoms for a few days acutely worsened tonight states has been unable to sleep took 3 Advil PM with attempt to fall asleep without relief.  States that there is also some associated shortness of breath and nausea.  Patient reports that he found out last night that his mother passed away believe that these are exacerbating his symptoms.  Denies PE risk factors or any other complaints at this time.          Prior to Admission Medications   Prescriptions Last Dose Informant Patient Reported? Taking?   benzonatate (TESSALON) 200 MG capsule   No No   Sig: Take 1 capsule (200 mg total) by mouth 3 (three) times a day as needed for cough   methylPREDNISolone 4 MG tablet therapy pack   No No   Sig: Use as directed on package      Facility-Administered Medications: None       History reviewed. No pertinent past medical history.    Past Surgical History:   Procedure Laterality Date    APPENDECTOMY      LAPAROSCOPIC CHOLECYSTECTOMY  05/20/2015       Family History   Problem Relation Age of Onset    Diabetes Mother     No Known Problems Father     No Known Problems Brother      I have reviewed and agree with the history as documented.    E-Cigarette/Vaping    E-Cigarette Use Never User      E-Cigarette/Vaping Substances    Nicotine No     THC No     CBD No     Flavoring No     Other No     Unknown No      Social History     Tobacco Use    Smoking status: Former     Current packs/day: 0.50     Average packs/day: 0.5 packs/day for 10.0 years (5.0 ttl pk-yrs)     Types: Cigarettes    Smokeless tobacco: Never    Tobacco comments:      quit 5 yrs ago-as per [place]   Vaping Use    Vaping status: Never Used   Substance Use Topics    Alcohol use: Not Currently     Comment: used to drink 1-2 drinks/aweek > 5years ago ffor 5-10 years    Drug use: Never        Review of Systems   Constitutional:  Negative for chills and fever.   HENT:  Negative for ear pain and sore throat.    Eyes:  Negative for pain and visual disturbance.   Respiratory:  Positive for shortness of breath. Negative for cough.    Cardiovascular:  Positive for chest pain. Negative for palpitations.   Gastrointestinal:  Positive for nausea. Negative for abdominal pain and vomiting.   Genitourinary:  Negative for dysuria and hematuria.   Musculoskeletal:  Negative for arthralgias and back pain.   Skin:  Negative for color change and rash.   Neurological:  Negative for seizures and syncope.   All other systems reviewed and are negative.      Physical Exam  ED Triage Vitals [07/26/24 2318]   Temperature Pulse Respirations Blood Pressure SpO2   97.7 °F (36.5 °C) 59 18 (!) 162/116 97 %      Temp Source Heart Rate Source Patient Position - Orthostatic VS BP Location FiO2 (%)   Oral Monitor Lying Right arm --      Pain Score       5             Orthostatic Vital Signs  Vitals:    07/26/24 2318   BP: (!) 162/116   Pulse: 59   Patient Position - Orthostatic VS: Lying       Physical Exam  Vitals and nursing note reviewed.   Constitutional:       General: He is not in acute distress.     Appearance: He is well-developed. He is not ill-appearing or diaphoretic.   HENT:      Head: Normocephalic and atraumatic.      Mouth/Throat:      Mouth: Mucous membranes are moist.   Eyes:      Extraocular Movements: Extraocular movements intact.      Conjunctiva/sclera: Conjunctivae normal.      Pupils: Pupils are equal, round, and reactive to light.   Cardiovascular:      Rate and Rhythm: Normal rate and regular rhythm.      Heart sounds: No murmur heard.  Pulmonary:      Effort: Pulmonary effort is normal. No  respiratory distress.      Breath sounds: Normal breath sounds.   Abdominal:      Palpations: Abdomen is soft.      Tenderness: There is no abdominal tenderness.   Musculoskeletal:         General: Normal range of motion.      Cervical back: Normal range of motion and neck supple.      Right lower leg: No edema.      Left lower leg: No edema.   Skin:     General: Skin is warm and dry.      Capillary Refill: Capillary refill takes less than 2 seconds.   Neurological:      General: No focal deficit present.      Mental Status: He is alert.   Psychiatric:         Attention and Perception: Attention and perception normal.         Mood and Affect: Mood is depressed. Affect is flat and tearful.         Speech: Speech normal.         Behavior: Behavior normal.         Thought Content: Thought content normal.         Judgment: Judgment normal.         ED Medications  Medications   LORazepam (ATIVAN) injection 1 mg (1 mg Intravenous Given 7/27/24 0042)       Diagnostic Studies  Results Reviewed       Procedure Component Value Units Date/Time    CBC and differential [[id number]] Collected: 07/27/24 0042    Lab Status: In process Specimen: Blood from Arm, Left Updated: 07/27/24 0052    Comprehensive metabolic panel [[id number]] Collected: 07/27/24 0042    Lab Status: In process Specimen: Blood from Arm, Left Updated: 07/27/24 0052    HS Troponin 0hr (reflex protocol) [[id number]] Collected: 07/27/24 0042    Lab Status: In process Specimen: Blood from Arm, Left Updated: 07/27/24 0052                   XR chest 1 view portable   ED Interpretation by Arsenio Najera DO (07/27 0052)   Wet read - normal cxr            Procedures  Procedures      ED Course  ED Course as of 07/27/24 0105   Sat Jul 27, 2024   0104 ECG 12 lead  NSR with sinus xiomara 59 bpm with sinus arrhythmia RAD, no ischemic changes                                        Medical Decision Making  Patient is a 38-year-old male presenting for evaluation of chest pain.   Doubt ACS likely some component of grief reaction.  Will obtain EKG chest x-ray labs monitor and reassess    EKG as above see ED course.  Labs pending a troponin.  Signed out pending troponin and possible need for delta and final disposition.  Patient hemodynamically stable at the time of signout    Amount and/or Complexity of Data Reviewed  Labs: ordered.  Radiology: ordered and independent interpretation performed.  ECG/medicine tests:  Decision-making details documented in ED Course.    Risk  Prescription drug management.          Disposition  Final diagnoses:   Chest pain   Grief reaction     Time reflects when diagnosis was documented in both MDM as applicable and the Disposition within this note       Time User Action Codes Description Comment    7/27/2024 12:28 AM Arsenio Najera [R07.9] Chest pain     7/27/2024 12:35 AM Raulito Paredes [F43.21] Grief reaction     7/27/2024 12:53 AM Arsenio Najera [F43.21] Feeling grief     7/27/2024 12:53 AM Arsenio Najera Remove [F43.21] Feeling grief           ED Disposition       None          Follow-up Information       Follow up With Specialties Details Why Contact Info    Brian Daniels DO Hematology and Oncology, Internal Medicine Call   [address] [place] [id number] [phone]              Patient's Medications   Discharge Prescriptions    LORAZEPAM (ATIVAN) 0.5 MG TABLET    Take 1 tablet (0.5 mg total) by mouth every 8 (eight) hours as needed for anxiety for up to 21 doses       Start Date: 7/27/2024 End Date: --       Order Dose: 0.5 mg       Quantity: 21 tablet    Refills: 0     No discharge procedures on file.    PDMP Review       None             ED Provider  Attending physically available and evaluated Fransisco Muir. I managed the patient along with the ED Attending.    Electronically Signed by           Arsenio Najera DO  07/27/24 1202

## 2024-07-28 LAB
ATRIAL RATE: 59 BPM
P AXIS: 54 DEGREES
PR INTERVAL: 154 MS
QRS AXIS: 100 DEGREES
QRSD INTERVAL: 92 MS
QT INTERVAL: 394 MS
QTC INTERVAL: 390 MS
T WAVE AXIS: 11 DEGREES
VENTRICULAR RATE: 59 BPM

## 2024-07-28 PROCEDURE — 93010 ELECTROCARDIOGRAM REPORT: CPT | Performed by: INTERNAL MEDICINE
